# Patient Record
Sex: MALE | Race: OTHER | HISPANIC OR LATINO | URBAN - METROPOLITAN AREA
[De-identification: names, ages, dates, MRNs, and addresses within clinical notes are randomized per-mention and may not be internally consistent; named-entity substitution may affect disease eponyms.]

---

## 2017-07-01 ENCOUNTER — OUTPATIENT (OUTPATIENT)
Dept: OUTPATIENT SERVICES | Facility: HOSPITAL | Age: 67
LOS: 1 days | End: 2017-07-01
Payer: MEDICAID

## 2017-07-01 PROCEDURE — G9001: CPT

## 2017-07-21 ENCOUNTER — EMERGENCY (EMERGENCY)
Facility: HOSPITAL | Age: 67
LOS: 1 days | Discharge: HOME | End: 2017-07-21
Attending: EMERGENCY MEDICINE | Admitting: EMERGENCY MEDICINE
Payer: COMMERCIAL

## 2017-07-21 VITALS
TEMPERATURE: 98 F | RESPIRATION RATE: 18 BRPM | HEART RATE: 71 BPM | SYSTOLIC BLOOD PRESSURE: 150 MMHG | DIASTOLIC BLOOD PRESSURE: 79 MMHG | OXYGEN SATURATION: 96 %

## 2017-07-21 PROCEDURE — 99282 EMERGENCY DEPT VISIT SF MDM: CPT

## 2017-07-21 NOTE — ED PROVIDER NOTE - NS ED ROS FT
Constitutional: no fever, no chills.  Eyes: no visual changes.  ENMT: no sore throat.  CV: no chest pain.  Resp: no cough, no shortness of breath.  GI: no abdominal pain, no nausea, no vomiting, no diarrhea.  : no dysuria, no hematuria.  MSK: no back pain, no neck pain.  Skin: no rashes.  Neuro: no headache, no loss of consciousness, no weakness, no numbness, no tingling.  Psych: no known mental health issues.  Endo: +diabetes, no thyroid trouble.

## 2017-07-21 NOTE — ED PROVIDER NOTE - CARE PLAN
Principal Discharge DX:	Varicose vein of leg Principal Discharge DX:	Varicose vein of leg  Instructions for follow-up, activity and diet:	1. Bleeding varicose vein  2. Keep dressings dry. Keep in place for 5-7 days.   3. Follow-up with your primary care doctor in 24-48hrs.   4. Return immediately to the emergency department if any worsening or concerning symptoms.

## 2017-07-21 NOTE — ED PROVIDER NOTE - OBJECTIVE STATEMENT
Resident: 67y M PMH DM presents with bleeding varicose vein. Occurred this morning while in showed, tried pressure dressing but continued bleeding. No anticoagulation.

## 2017-07-21 NOTE — ED PROVIDER NOTE - PLAN OF CARE
1. Bleeding varicose vein  2. Keep dressings dry. Keep in place for 5-7 days.   3. Follow-up with your primary care doctor in 24-48hrs.   4. Return immediately to the emergency department if any worsening or concerning symptoms.

## 2017-07-21 NOTE — ED PROVIDER NOTE - ATTENDING CONTRIBUTION TO CARE
Attending MD Venegas:  I personally have seen and examined this patient.  Resident note reviewed and agree on plan of care and except where noted.  See HPI, PE, and MDM for details.

## 2017-07-21 NOTE — ED PROVIDER NOTE - MEDICAL DECISION MAKING DETAILS
Attending MD Venegas: 67M presenting with bleeding from right anterior shin varicosity, no anticoagulants. Will attempt local hemostatic control with surgicel

## 2017-07-21 NOTE — ED ADULT NURSE NOTE - OBJECTIVE STATEMENT
67 year old male presents to ED c/o varicose vein that started bleeding while he was in the shower. Surgicel applied by ED MD, patient denies any pain or discomfort.

## 2017-07-21 NOTE — ED PROVIDER NOTE - PHYSICAL EXAMINATION
Attending MD Venegas: A & O x 3, NAD, right anterior shin: oozing varicosity, bilateral venous stasis changes of b/l shins. sensation intact in b/l LEs, 2+ dp pulses b/l

## 2017-07-24 DIAGNOSIS — R69 ILLNESS, UNSPECIFIED: ICD-10-CM

## 2019-08-31 ENCOUNTER — EMERGENCY (EMERGENCY)
Facility: HOSPITAL | Age: 69
LOS: 1 days | Discharge: HOME | End: 2019-08-31
Attending: EMERGENCY MEDICINE
Payer: COMMERCIAL

## 2019-08-31 VITALS
SYSTOLIC BLOOD PRESSURE: 113 MMHG | TEMPERATURE: 98 F | WEIGHT: 192.02 LBS | HEART RATE: 84 BPM | DIASTOLIC BLOOD PRESSURE: 71 MMHG | OXYGEN SATURATION: 99 % | HEIGHT: 68 IN | RESPIRATION RATE: 18 BRPM

## 2019-08-31 PROBLEM — I10 ESSENTIAL (PRIMARY) HYPERTENSION: Chronic | Status: ACTIVE | Noted: 2017-07-21

## 2019-08-31 PROBLEM — E11.9 TYPE 2 DIABETES MELLITUS WITHOUT COMPLICATIONS: Chronic | Status: ACTIVE | Noted: 2017-07-21

## 2019-08-31 LAB
ALBUMIN SERPL ELPH-MCNC: 4.4 G/DL — SIGNIFICANT CHANGE UP (ref 3.3–5)
ALP SERPL-CCNC: 65 U/L — SIGNIFICANT CHANGE UP (ref 40–120)
ALT FLD-CCNC: 16 U/L — SIGNIFICANT CHANGE UP (ref 10–45)
ANION GAP SERPL CALC-SCNC: 16 MMOL/L — SIGNIFICANT CHANGE UP (ref 5–17)
APPEARANCE UR: CLEAR — SIGNIFICANT CHANGE UP
APTT BLD: 31.6 SEC — SIGNIFICANT CHANGE UP (ref 27.5–36.3)
AST SERPL-CCNC: 12 U/L — SIGNIFICANT CHANGE UP (ref 10–40)
BACTERIA # UR AUTO: NEGATIVE — SIGNIFICANT CHANGE UP
BASOPHILS # BLD AUTO: 0 K/UL — SIGNIFICANT CHANGE UP (ref 0–0.2)
BASOPHILS NFR BLD AUTO: 0.4 % — SIGNIFICANT CHANGE UP (ref 0–2)
BILIRUB SERPL-MCNC: 0.5 MG/DL — SIGNIFICANT CHANGE UP (ref 0.2–1.2)
BILIRUB UR-MCNC: NEGATIVE — SIGNIFICANT CHANGE UP
BLD GP AB SCN SERPL QL: NEGATIVE — SIGNIFICANT CHANGE UP
BUN SERPL-MCNC: 16 MG/DL — SIGNIFICANT CHANGE UP (ref 7–23)
CALCIUM SERPL-MCNC: 10 MG/DL — SIGNIFICANT CHANGE UP (ref 8.4–10.5)
CHLORIDE SERPL-SCNC: 96 MMOL/L — SIGNIFICANT CHANGE UP (ref 96–108)
CO2 SERPL-SCNC: 25 MMOL/L — SIGNIFICANT CHANGE UP (ref 22–31)
COLOR SPEC: YELLOW — SIGNIFICANT CHANGE UP
COMMENT - URINE: SIGNIFICANT CHANGE UP
CREAT SERPL-MCNC: 0.86 MG/DL — SIGNIFICANT CHANGE UP (ref 0.5–1.3)
DIFF PNL FLD: NEGATIVE — SIGNIFICANT CHANGE UP
EOSINOPHIL # BLD AUTO: 0.3 K/UL — SIGNIFICANT CHANGE UP (ref 0–0.5)
EOSINOPHIL NFR BLD AUTO: 2.6 % — SIGNIFICANT CHANGE UP (ref 0–6)
EPI CELLS # UR: 0 — SIGNIFICANT CHANGE UP
ETHANOL SERPL-MCNC: SIGNIFICANT CHANGE UP MG/DL (ref 0–10)
GLUCOSE BLDC GLUCOMTR-MCNC: 131 MG/DL — HIGH (ref 70–99)
GLUCOSE BLDC GLUCOMTR-MCNC: 136 MG/DL — HIGH (ref 70–99)
GLUCOSE SERPL-MCNC: 128 MG/DL — HIGH (ref 70–99)
GLUCOSE UR QL: NEGATIVE — SIGNIFICANT CHANGE UP
HCT VFR BLD CALC: 42.9 % — SIGNIFICANT CHANGE UP (ref 39–50)
HGB BLD-MCNC: 14.8 G/DL — SIGNIFICANT CHANGE UP (ref 13–17)
HYALINE CASTS # UR AUTO: 4 /LPF — SIGNIFICANT CHANGE UP (ref 0–7)
INR BLD: 1.09 RATIO — SIGNIFICANT CHANGE UP (ref 0.88–1.16)
KETONES UR-MCNC: NEGATIVE — SIGNIFICANT CHANGE UP
LEUKOCYTE ESTERASE UR-ACNC: NEGATIVE — SIGNIFICANT CHANGE UP
LYMPHOCYTES # BLD AUTO: 1.9 K/UL — SIGNIFICANT CHANGE UP (ref 1–3.3)
LYMPHOCYTES # BLD AUTO: 20 % — SIGNIFICANT CHANGE UP (ref 13–44)
MCHC RBC-ENTMCNC: 34.5 GM/DL — SIGNIFICANT CHANGE UP (ref 32–36)
MCHC RBC-ENTMCNC: 34.8 PG — HIGH (ref 27–34)
MCV RBC AUTO: 101 FL — HIGH (ref 80–100)
MONOCYTES # BLD AUTO: 0.7 K/UL — SIGNIFICANT CHANGE UP (ref 0–0.9)
MONOCYTES NFR BLD AUTO: 7.8 % — SIGNIFICANT CHANGE UP (ref 2–14)
NEUTROPHILS # BLD AUTO: 6.6 K/UL — SIGNIFICANT CHANGE UP (ref 1.8–7.4)
NEUTROPHILS NFR BLD AUTO: 69.1 % — SIGNIFICANT CHANGE UP (ref 43–77)
NITRITE UR-MCNC: NEGATIVE — SIGNIFICANT CHANGE UP
PH UR: 5.5 — SIGNIFICANT CHANGE UP (ref 5–8)
PLATELET # BLD AUTO: 284 K/UL — SIGNIFICANT CHANGE UP (ref 150–400)
POTASSIUM SERPL-MCNC: 3.8 MMOL/L — SIGNIFICANT CHANGE UP (ref 3.5–5.3)
POTASSIUM SERPL-SCNC: 3.8 MMOL/L — SIGNIFICANT CHANGE UP (ref 3.5–5.3)
PROT SERPL-MCNC: 7.4 G/DL — SIGNIFICANT CHANGE UP (ref 6–8.3)
PROT UR-MCNC: ABNORMAL
PROTHROM AB SERPL-ACNC: 12.5 SEC — SIGNIFICANT CHANGE UP (ref 10–12.9)
RBC # BLD: 4.26 M/UL — SIGNIFICANT CHANGE UP (ref 4.2–5.8)
RBC # FLD: 11.7 % — SIGNIFICANT CHANGE UP (ref 10.3–14.5)
RBC CASTS # UR COMP ASSIST: 2 /HPF — SIGNIFICANT CHANGE UP (ref 0–4)
RH IG SCN BLD-IMP: POSITIVE — SIGNIFICANT CHANGE UP
SODIUM SERPL-SCNC: 137 MMOL/L — SIGNIFICANT CHANGE UP (ref 135–145)
SP GR SPEC: 1.02 — SIGNIFICANT CHANGE UP (ref 1.01–1.02)
UROBILINOGEN FLD QL: ABNORMAL
WBC # BLD: 9.5 K/UL — SIGNIFICANT CHANGE UP (ref 3.8–10.5)
WBC # FLD AUTO: 9.5 K/UL — SIGNIFICANT CHANGE UP (ref 3.8–10.5)
WBC UR QL: 1 /HPF — SIGNIFICANT CHANGE UP (ref 0–5)

## 2019-08-31 PROCEDURE — 70450 CT HEAD/BRAIN W/O DYE: CPT | Mod: 26,59

## 2019-08-31 PROCEDURE — 70498 CT ANGIOGRAPHY NECK: CPT | Mod: 26

## 2019-08-31 PROCEDURE — 99218: CPT

## 2019-08-31 PROCEDURE — 70496 CT ANGIOGRAPHY HEAD: CPT | Mod: 26

## 2019-08-31 PROCEDURE — 70450 CT HEAD/BRAIN W/O DYE: CPT | Mod: 26,59,77

## 2019-08-31 PROCEDURE — 99283 EMERGENCY DEPT VISIT LOW MDM: CPT

## 2019-08-31 RX ORDER — ACETAMINOPHEN 500 MG
650 TABLET ORAL ONCE
Refills: 0 | Status: COMPLETED | OUTPATIENT
Start: 2019-08-31 | End: 2019-08-31

## 2019-08-31 RX ORDER — INSULIN LISPRO 100/ML
VIAL (ML) SUBCUTANEOUS AT BEDTIME
Refills: 0 | Status: DISCONTINUED | OUTPATIENT
Start: 2019-08-31 | End: 2019-09-14

## 2019-08-31 RX ORDER — SODIUM CHLORIDE 9 MG/ML
1000 INJECTION, SOLUTION INTRAVENOUS
Refills: 0 | Status: DISCONTINUED | OUTPATIENT
Start: 2019-08-31 | End: 2019-09-14

## 2019-08-31 RX ORDER — METOCLOPRAMIDE HCL 10 MG
10 TABLET ORAL ONCE
Refills: 0 | Status: COMPLETED | OUTPATIENT
Start: 2019-08-31 | End: 2019-08-31

## 2019-08-31 RX ORDER — DEXTROSE 50 % IN WATER 50 %
15 SYRINGE (ML) INTRAVENOUS ONCE
Refills: 0 | Status: DISCONTINUED | OUTPATIENT
Start: 2019-08-31 | End: 2019-09-14

## 2019-08-31 RX ORDER — GLUCAGON INJECTION, SOLUTION 0.5 MG/.1ML
1 INJECTION, SOLUTION SUBCUTANEOUS ONCE
Refills: 0 | Status: DISCONTINUED | OUTPATIENT
Start: 2019-08-31 | End: 2019-09-14

## 2019-08-31 RX ORDER — DEXTROSE 50 % IN WATER 50 %
12.5 SYRINGE (ML) INTRAVENOUS ONCE
Refills: 0 | Status: DISCONTINUED | OUTPATIENT
Start: 2019-08-31 | End: 2019-09-14

## 2019-08-31 RX ORDER — DEXTROSE 50 % IN WATER 50 %
25 SYRINGE (ML) INTRAVENOUS ONCE
Refills: 0 | Status: DISCONTINUED | OUTPATIENT
Start: 2019-08-31 | End: 2019-09-14

## 2019-08-31 RX ORDER — INSULIN LISPRO 100/ML
VIAL (ML) SUBCUTANEOUS
Refills: 0 | Status: DISCONTINUED | OUTPATIENT
Start: 2019-08-31 | End: 2019-09-14

## 2019-08-31 RX ADMIN — Medication 650 MILLIGRAM(S): at 13:20

## 2019-08-31 RX ADMIN — Medication 10 MILLIGRAM(S): at 13:20

## 2019-08-31 RX ADMIN — Medication 650 MILLIGRAM(S): at 13:57

## 2019-08-31 NOTE — ED ADULT TRIAGE NOTE - CHIEF COMPLAINT QUOTE
Fall on Monday with diagnosed left jaw fracture and intracranial hemorrhage. Dizziness, nausea, headache since yesterday.

## 2019-08-31 NOTE — ED CDU PROVIDER DISPOSITION NOTE - NSFOLLOWUPINSTRUCTIONS_ED_ALL_ED_FT
- stay hydrated. take all medications as prescribed  - Take tylenol 975mg as needed for pain.   - follow up with your pcp in 1-2 days.  - follow up with neurosurgery this week, call [phone] to make an appointment.  - return if symptoms worsen, have changes in vision, numbness, tingling, paresthesias, worsening headaches, and all other concerns. - stay hydrated. take all medications as prescribed  - Take tylenol 975mg as needed for pain. do not take ibuprofen/aleve/neproxen for pain. Do not take aspirin until neurosurgery says you can start it again.   - follow up with your pcp in 1-2 days.  - follow up with neurosurgery this week, call [phone] to make an appointment.  - return if symptoms worsen, have changes in vision, numbness, tingling, paresthesias, worsening headaches, and all other concerns. - stay hydrated. take all medications as prescribed  - Take tylenol 975mg as needed for pain. do not take ibuprofen/aleve/neproxen for pain. Do not take aspirin until neurosurgery says you can start it again.   - follow up with your pcp in 1-2 days.  - follow up with neurosurgery this week, call [phone] to make an appointment.  -Follow up with ENT this week, call [phone] to make an appointment.  - return if symptoms worsen, have changes in vision, numbness, tingling, paresthesias, worsening headaches, and all other concerns. - stay hydrated. take all medications as prescribed  - Take tylenol 975mg as needed for pain. do not take ibuprofen/aleve/neproxen for pain. Do not take aspirin until neurosurgery says you can start it again.      Use Ofloxacin 5 drops in left ear 2 x per day     Continue Keppra as previously prescribed  - follow up with your pcp in 1-2 days.  - follow up with neurosurgery this week, Dr. Orozco [phone]  - Follow up with ENT this week, call [phone] to make an appointment.  - return if symptoms worsen, have changes in vision, numbness, tingling, paresthesias, worsening headaches, and all other concerns.

## 2019-08-31 NOTE — ED ADULT NURSE REASSESSMENT NOTE - COMFORT CARE
repositioned/wait time explained/assisted to bathroom/plan of care explained
walker provided./ambulated to bathroom/darkened lights/plan of care explained/po fluids offered/repositioned/warm blanket provided

## 2019-08-31 NOTE — ED CDU PROVIDER INITIAL DAY NOTE - OBJECTIVE STATEMENT
68yo M pmh DM, htn, recent head bleed s/p fall on Monday presenting with headache. Patient was drinking monday evening, fell onto left side of face, seen at [place] and found to have left occipital fracture and some sort of "head bleed". Patient was discharged on Wednesday and has been taking NaCl tabs and keppra. Stopped taking NaCl tabs Thursday because they make him nauseous. Patient has a headache and dizziness that seems fairly constant, sometimes feels like the room is spinning. This has been persistent since Monday, nothing worse than before. Daily drinker, 2 drinks per night, states he stopped since his fall.    In ED pt's labwork non-actionable, CTH showed Acute hemorrhage involving the bilateral  anterior paramedian frontal lobes with no midline shift, mass effect or hydrocephalus, neurosurgery consulted, felt more contusions 2/2 pt's fall 5 d ago, recommended 4 hr interval CTH and continued observation. Pt sent to CDU fro q4 hr neuro checks, 4hr interval CTH, continued observation. Case discussed w/ ED attending Dr. Galvan.

## 2019-08-31 NOTE — ED CDU PROVIDER DISPOSITION NOTE - CLINICAL COURSE
70yo M pmh DM, htn, recent head bleed s/p fall on Monday presenting with headache. Patient was drinking monday evening, fell onto left side of face, seen at [place] and found to have left occipital fracture and some sort of "head bleed". Patient was discharged on Wednesday and has been taking NaCl tabs and keppra. Stopped taking NaCl tabs Thursday because they make him nauseous. Patient has a headache and dizziness that seems fairly constant, sometimes feels like the room is spinning. This has been persistent since Monday, nothing worse than before. Daily drinker, 2 drinks per night, states he stopped since his fall.  	In ED pt's labwork non-actionable, CTH showed Acute hemorrhage involving the bilateral  anterior paramedian frontal lobes with no midline shift, mass effect or hydrocephalus, neurosurgery consulted, felt more contusions 2/2 pt's fall 5 d ago, recommended 4 hr interval CTH and continued observation. Pt sent to CDU fro q4 hr neuro checks, 4hr interval CTH, continued observation.           In CDU pts repeat CT scan showed no interval change of bleed, Lobulated high density structure in the right inferior frontal lobe measuring up to 1.1 cm concerning for aneurysm. CTA completed which Previously noted lobulated high density structure in the right inferior frontal lobe represents another intraparenchymal bleed, less likely an aneurysm. Neuro surgery reviewed CT scans and state bleed is stable and unchanged despite results of CTA, feel comfortable to pt going home, however Pt and Dr. Galvan planned on pt staying over night for neuro checks. Pt with unchanged unsteadiness with ambulation, walks with walker, will d/c home with PMD and neurosurgery follow up. Case D/w______ 68yo M pmh DM, htn, recent head bleed s/p fall on Monday presenting with headache. Patient was drinking monday evening, fell onto left side of face, seen at [place] and found to have left occipital fracture and some sort of "head bleed". Patient was discharged on Wednesday and has been taking NaCl tabs and keppra. Stopped taking NaCl tabs Thursday because they make him nauseous. Patient has a headache and dizziness that seems fairly constant, sometimes feels like the room is spinning. This has been persistent since Monday, nothing worse than before. Daily drinker, 2 drinks per night, states he stopped since his fall.  	In ED pt's labwork non-actionable, CTH showed Acute hemorrhage involving the bilateral  anterior paramedian frontal lobes with no midline shift, mass effect or hydrocephalus, neurosurgery consulted, felt more contusions 2/2 pt's fall 5 d ago, recommended 4 hr interval CTH and continued observation. Pt sent to CDU fro q4 hr neuro checks, 4hr interval CTH, continued observation.           In CDU pts repeat CT scan showed no interval change of bleed, Lobulated high density structure in the right inferior frontal lobe measuring up to 1.1 cm concerning for aneurysm. CTA completed which Previously noted lobulated high density structure in the right inferior frontal lobe represents another intraparenchymal bleed, less likely an aneurysm. Neuro surgery reviewed CT scans and state bleed is stable and unchanged, however presence of hemotympanum noted on CTA, ENT consulted, recommends________. Pt walks with walker, will d/c home with PMD and neurosurgery/ENT follow up. Case D/w______ 68yo M pmh DM, htn, recent head bleed s/p fall on Monday presenting with headache. Patient was drinking monday evening, fell onto left side of face, seen at [place] and found to have left occipital fracture and some sort of "head bleed". Patient was discharged on Wednesday and has been taking NaCl tabs and keppra. Stopped taking NaCl tabs Thursday because they make him nauseous. Patient has a headache and dizziness that seems fairly constant, sometimes feels like the room is spinning. This has been persistent since Monday, nothing worse than before. Daily drinker, 2 drinks per night, states he stopped since his fall.  	In ED pt's labwork non-actionable, CTH showed Acute hemorrhage involving the bilateral  anterior paramedian frontal lobes with no midline shift, mass effect or hydrocephalus, neurosurgery consulted, felt more contusions 2/2 pt's fall 5 d ago, recommended 4 hr interval CTH and continued observation. Pt sent to CDU fro q4 hr neuro checks, 4hr interval CTH, continued observation.           In CDU pts repeat CT scan showed no interval change of bleed, Lobulated high density structure in the right inferior frontal lobe measuring up to 1.1 cm concerning for aneurysm. CTA completed which Previously noted lobulated high density structure in the right inferior frontal lobe represents another intraparenchymal bleed, less likely an aneurysm. Neuro surgery reviewed CT scans and state bleed is stable and unchanged, however presence of hemotympanum noted on CTA, ENT consulted, recommends ofloxacin drops and outpatient follow up. Pt walks with walker, will d/c home with PMD and neurosurgery/ENT follow up. Case D/w Dr. downing

## 2019-08-31 NOTE — ED PROVIDER NOTE - PROGRESS NOTE DETAILS
AK: Spoke to NeuroSx, recommend 4hr repeat CTH. No ddavp or platelets. Suspect resolving contusion/bleed, not acute. Will discuss with attending and see patient. AK: Spoke with neurosx. Agree with plan for CDU. Accepted by CDU.

## 2019-08-31 NOTE — CONSULT NOTE ADULT - ASSESSMENT
NathanFerdinand  69M pmhx etoh on ASA81 dependence s/p fall 5 days ago treated at [place] for bifrontal contusions represents for episode of dizziness and intermittent headache. CT scan shows subacute bifrontal contusions, intact on exam, sodium WNL  - continue keppra  - family requests overnight observation, q4h neurochecks  - repeat CTH in 4h, if stable may f/u as outpatient after discharge NahtanFerdinand  69M pmhx etoh on ASA81 dependence s/p fall 5 days ago treated at [place] for bifrontal contusions represents for episode of dizziness and intermittent headache. CT scan shows subacute bifrontal contusions, intact on exam, sodium WNL  - continue keppra  - family requests overnight observation, q4h neurochecks  - repeat CTH in 4h, if stable may f/u as outpatient after discharge  - Hold asa81 until cleared as outpatient

## 2019-08-31 NOTE — ED PROVIDER NOTE - CLINICAL SUMMARY MEDICAL DECISION MAKING FREE TEXT BOX
ATTG: : head trauma on Mon with SDH, here with continued headache and non com with meds and concern for bleeding electrolyte abnl. will check ct head, check labs, check urine, re eval for dispo.

## 2019-08-31 NOTE — ED PROVIDER NOTE - ATTENDING CONTRIBUTION TO CARE
70 y/o m with pmhx DM type 2, HTN, SDH from a fall on Monday, seen at [facility], treated and discharged, also with left occipital fracture. he was prescribed medications and has not been complaint with sodium tabs secondary to nasuea and vomiting. son with him at the bedside. states he was drinking approx 2-3 drinks liquor daily since wife  a few months ago which caused him to fall. no fever or chills. has a headache worsening and associated dizziness. no chest pain or heart palp. no other complaints.   GCS 15  Gen.  no acute distress  HEENT:  perrl eomi pupils 3mm reactive to light equally, c spine no step off or tenderness  back no step off or tend  Lungs:  b/l bs  CVS: S1S2   Abd;  soft non tender. no distention. healed surgical wound lower abd closed.   Ext: no pitting edema or erythema  Neuro: aaox3, clear speech. appropriate  MSK: moving all ext spon . strength 5/5 b/l upper and lower ext

## 2019-08-31 NOTE — ED CDU PROVIDER DISPOSITION NOTE - CARE PROVIDERS DIRECT ADDRESSES
[email or web address]

## 2019-08-31 NOTE — ED CDU PROVIDER DISPOSITION NOTE - PATIENT PORTAL LINK FT
You can access the FollowMyHealth Patient Portal offered by [facility] by registering at the following website: [email or web address]. By joining [employer]’s FollowMyHealth portal, you will also be able to view your health information using other applications (apps) compatible with our system.

## 2019-08-31 NOTE — ED ADULT NURSE REASSESSMENT NOTE - GENERAL PATIENT STATE
no change observed/comfortable appearance/cooperative/resting/sleeping/smiling/interactive
comfortable appearance/resting/sleeping/smiling/interactive/cooperative/improvement verbalized/family/SO at bedside

## 2019-08-31 NOTE — ED CDU PROVIDER DISPOSITION NOTE - NSFOLLOWUPCLINICS_GEN_ALL_ED_FT
[facility] - ENT  Otolaryngology (ENT)  [address] [place] [address]  Phone: [phone]  Fax:   Follow Up Time:

## 2019-08-31 NOTE — ED CDU PROVIDER INITIAL DAY NOTE - PROGRESS NOTE DETAILS
Rpt CTH shows stable bifrontal contusions, however now noting right inferior frontal lobe which appears to communicate with the supraclinoid right internal carotid artery, concerning for an aneurysm. Spoke w/ neurosurg resident Raad who was made aware of this finding, advised to obtain CTA head/neck for further eval. Attending aware. - Alfa Ngo PA-C Rpt CTH shows stable bifrontal contusions, however now noting right inferior frontal lobe which appears to communicate with the supraclinoid right internal carotid artery, concerning for an aneurysm. Spoke w/ neurosurg resident Raad who was made aware of this finding, advised to obtain CTA head/neck for further eval. Attending aware. No change in pt exam. - Alfa Ngo PA-C Rpt CTH shows stable bifrontal contusions, however now noting right inferior frontal lobe which appears to communicate with the supraclinoid right internal carotid artery, concerning for an aneurysm. Spoke w/ neurosurg resident Raad who was made aware of this finding, advised to obtain CTA head/neck for further eval. Attending Dr. Galvan made aware, agrees w/ plan. No change in pt exam. - Alfa Ngo PA-C CTA prelimr ead shows another possible intraparenchymal bleed. Called Neurosurgery and spoke with them about results, they reviewed all scans and results and advised the scans look stable, no change from initial, no change in initial plan. They advised pt could be discharged from their standpoint however initial ED team requested overnight observation. Will continue to monitor. Pt signed out to oncoming night PA. - Alfa Ngo PA-C CTA prelim read shows another possible intraparenchymal bleed, less likely an aneursym. Called Neurosurgery and spoke with them about results, they reviewed all scans and results and advised the scans look stable, no change from initial, no change in initial plan. They advised pt could be discharged from their standpoint however initial ED team requested overnight observation. Will continue to monitor. Pt signed out to oncoming night PA. - Alfa Ngo PA-C Pt resting comfortably. NAD. No complaints. VSS. to new numbness, tingling paresthesias, no pronator drift, strength 5/5 BL U/LE. CT showed stable intraparenchymal bleed per neurosurgery, pt would like to stay until the morning for monitoring and as per original plan per ED team. no events on tele will continue to monitor.

## 2019-08-31 NOTE — ED PROVIDER NOTE - PHYSICAL EXAMINATION
Gen: No acute distress, alert, cooperative  HENT: Normocephalic, atraumatic.   Eyes: PERRL, 3mm reactive ESAU, EOMI  Resp: CTAB, non-labored, no wheeze  CV: rrr, no murmur  Abd: soft, NTND, no rebound or guarding, healed surgical scar lower abdomen  MSK: No CVAT bilaterally, no midline ttp  Skin: warm and dry  Neuro: AOx3, speech is fluent and appropriate, no focal deficits, normal strength and sensation throughout, normal gait, finger to nose normal

## 2019-08-31 NOTE — ED PROVIDER NOTE - OBJECTIVE STATEMENT
70yo M pmh DM, htn, recent head bleed s/p fall on Monday presenting with headache. Patient was drinking monday evening, fell onto left side of face, seen at [place] and found to have left occipital fracture and some sort of "head bleed". Patient was discharged on Wednesday and has been taking NaCl tabs and keppra. Stopped taking NaCl tabs Thursday because they make him nauseous. Patient has a headache and dizziness that seems fairly constant, sometimes feels like the room is spinning. This has been persistent since Monday, nothing worse than before. Daily drinker, 2 drinks per night, states he stopped since his fall.

## 2019-08-31 NOTE — ED ADULT NURSE NOTE - OBJECTIVE STATEMENT
70 y/o M, PMH HTN, DMT2, presents to ED with son for worsening headache, nausea, dizziness s/p fall and dx ICH at [place] on Monday. Pt's son reports pt's wife  in April and he has been drinking 2-3 shots of scotch every day since, which is what caused him to fall and hit L side of his head on Monday, dx with ICH in [place] and d/c after 3 days. Son brought pt in because he has been unable to take his meds due to nausea. Pt c/o mild 5/10 L headache, dizziness and intermittent nausea, no vomiting at this time. Pt AAOx3, no confusion, neurologically intact, PERRLA 2mm, no blurry vision, NAD, resp nonlabored, abdomen soft nontender nondistended, pt following commands, Padmini independently, 5/5 equal strength to extremities x 4, no sensory deficits, no numbness/tingling, strong peripheral pulses x 4, cap refill < 2 seconds, skin warm and dry, ambulating independently, steady gait noted. Pt denies SI/HI. Safety maintained.

## 2019-08-31 NOTE — ED CDU PROVIDER DISPOSITION NOTE - CARE PROVIDER_API CALL
Patrick Orozco)  Neurosurgery  General  [address] [facility] [address], [address]  [place] [address]  Phone: [phone]  Fax: [phone]  Follow Up Time:

## 2019-09-01 ENCOUNTER — OUTPATIENT (OUTPATIENT)
Dept: OUTPATIENT SERVICES | Facility: HOSPITAL | Age: 69
LOS: 1 days | End: 2019-09-01
Payer: MEDICARE

## 2019-09-01 VITALS
RESPIRATION RATE: 18 BRPM | OXYGEN SATURATION: 97 % | TEMPERATURE: 98 F | HEART RATE: 75 BPM | SYSTOLIC BLOOD PRESSURE: 128 MMHG | DIASTOLIC BLOOD PRESSURE: 77 MMHG

## 2019-09-01 DIAGNOSIS — H74.8X9 OTHER SPECIFIED DISORDERS OF MIDDLE EAR AND MASTOID, UNSPECIFIED EAR: ICD-10-CM

## 2019-09-01 LAB
GLUCOSE BLDC GLUCOMTR-MCNC: 149 MG/DL — HIGH (ref 70–99)
HBA1C BLD-MCNC: 8.2 % — HIGH (ref 4–5.6)

## 2019-09-01 PROCEDURE — G9001: CPT

## 2019-09-01 PROCEDURE — 81001 URINALYSIS AUTO W/SCOPE: CPT

## 2019-09-01 PROCEDURE — 70496 CT ANGIOGRAPHY HEAD: CPT

## 2019-09-01 PROCEDURE — 99217: CPT

## 2019-09-01 PROCEDURE — 85610 PROTHROMBIN TIME: CPT

## 2019-09-01 PROCEDURE — 86901 BLOOD TYPING SEROLOGIC RH(D): CPT

## 2019-09-01 PROCEDURE — 83036 HEMOGLOBIN GLYCOSYLATED A1C: CPT

## 2019-09-01 PROCEDURE — 86850 RBC ANTIBODY SCREEN: CPT

## 2019-09-01 PROCEDURE — 96374 THER/PROPH/DIAG INJ IV PUSH: CPT | Mod: XU

## 2019-09-01 PROCEDURE — 80307 DRUG TEST PRSMV CHEM ANLYZR: CPT

## 2019-09-01 PROCEDURE — 80053 COMPREHEN METABOLIC PANEL: CPT

## 2019-09-01 PROCEDURE — 70498 CT ANGIOGRAPHY NECK: CPT

## 2019-09-01 PROCEDURE — 82962 GLUCOSE BLOOD TEST: CPT

## 2019-09-01 PROCEDURE — 86900 BLOOD TYPING SEROLOGIC ABO: CPT

## 2019-09-01 PROCEDURE — 99284 EMERGENCY DEPT VISIT MOD MDM: CPT | Mod: 25

## 2019-09-01 PROCEDURE — 85027 COMPLETE CBC AUTOMATED: CPT

## 2019-09-01 PROCEDURE — 70450 CT HEAD/BRAIN W/O DYE: CPT

## 2019-09-01 PROCEDURE — 85730 THROMBOPLASTIN TIME PARTIAL: CPT

## 2019-09-01 PROCEDURE — G0378: CPT

## 2019-09-01 RX ORDER — OFLOXACIN 0.3 %
5 DROPS OPHTHALMIC (EYE)
Refills: 0 | Status: DISCONTINUED | OUTPATIENT
Start: 2019-09-01 | End: 2019-09-14

## 2019-09-01 RX ADMIN — Medication 5 DROP(S): at 11:37

## 2019-09-01 NOTE — ED CDU PROVIDER SUBSEQUENT DAY NOTE - HISTORY
Pt resting comfortably. NAD. No complaints. VSS. no events on telemetry, no new neurological defects, no numbness, tingling, paresthesias, weaknesses or worsening ataxia. Pt resting comfortably. NAD. No complaints. VSS. no events on telemetry, no new neurological defects, no numbness, tingling, paresthesias, weaknesses or worsening ataxia, ambulating steadily with walker. -Jayna Elena PA-C

## 2019-09-01 NOTE — ED ADULT NURSE REASSESSMENT NOTE - NS ED NURSE REASSESS COMMENT FT1
report taken from Cherrie MCGOWAN. states pt had good night with no complaints. Will continue to monitor.
Pt AAOx3, NAD, resp nonlabored, resting comfortably in bed with family at bedside. Pt ambulated to bathroom with 1 assist. Pt reports some improvement in dizziness, still experiences it when sitting up and standing up. No nausea at this time, no vomiting in ED. Pt denies headache, chest pain, palpitations, SOB, abd pain, fevers, chills, weakness at this time. Pt awaiting CDU consult and dispo. Safety maintained.
Received pt from ROSLYN Callaway , received pt alert and responsive, oriented x4, denies any respiratory distress, SOB, or difficulty breathing. Pt transferred to CDU for dizziness. + c/o dizziness and unsteady gait. Neuro checks to be completed q4.  IV in place, patent and free of signs of infiltration, V/S stable, pt afebrile, pt denies pain at this time. Pt educated on unit and unit rules, instructed patient to notify RN of any needed assistance, Pt verbalizes understanding, Call bell placed within reach. Safety maintained. Will continue to monitor. Meal provided.

## 2019-09-01 NOTE — CONSULT NOTE ADULT - SUBJECTIVE AND OBJECTIVE BOX
p (1480)     HPI:  69M pmhx etoh on ASA81 dependence s/p fall 5 days ago treated at [facility] for bifrontal contusions represents for episode of dizziness and intermittent headache. CT scan shows subacute bifrontal contusions    Imaging:    Exam:  AOx3, FC, PERRL, EOMI, no facial   5/5 throughout, no drift  SILT  no clonus    --Anticoagulation:    =====================  PAST MEDICAL HISTORY   Hypertension  Diabetes    PAST SURGICAL HISTORY         MEDICATIONS:  Antibiotics:    Neuro:    Other:      SOCIAL HISTORY:   Occupation:   Marital Status:     FAMILY HISTORY:      ROS: Negative except per HPI    LABS:  PT/INR - ( 31 Aug 2019 13:38 )   PT: 12.5 sec;   INR: 1.09 ratio         PTT - ( 31 Aug 2019 13:38 )  PTT:31.6 sec                        14.8   9.5   )-----------( 284      ( 31 Aug 2019 13:38 )             42.9     08-31    137  |  96  |  16  ----------------------------<  128<H>  3.8   |  25  |  0.86    Ca    10.0      31 Aug 2019 13:38    TPro  7.4  /  Alb  4.4  /  TBili  0.5  /  DBili  x   /  AST  12  /  ALT  16  /  AlkPhos  65  08-31
CC: Left Hemotympanum.     HPI: 70 YO M with PMH of HTN, DM, Recent Head bleed  s/p fall on Monday presenting with headache. Patient was drinking Monday evening, fell onto left side of face, seen at [facility] and found to have left occipital fracture and some sort of "head bleed". Patient was discharged on Wednesday and has been taking NaCl tabs and keppra. Stopped taking NaCl tabs Thursday because they make him nauseous. Patient has a headache and dizziness that seems fairly constant, sometimes feels like the room is spinning. This has been persistent since Monday, nothing worse than before. In ED pt's  CTH showed Acute hemorrhage involving the bilateral  anterior paramedian frontal lobes with no midline shift, mass effect or hydrocephalus, neurosurgery consulted. ENT was consulted for Left Hemotympanum. CT Angio of Brain showed " Near complete opacification left mastoid air cells and left middle ear cavity is compatible with hemotympanum"  . Pt was evaluated. States HA Improved. Doesn't feel Nausea/ Dizzy anymore. Admits Left sided tinnitus, denies hearing loss. Otherwise denies CP/SOB/Palpitations/ Diaphoresis, Blurry vision, fever/chills, Abdominal Pain/N/V/D, Hoarseness Dysphagia/ Odynophagia, recent travel/sick contacts.     PAST MEDICAL & SURGICAL HISTORY:  Hypertension  Diabetes.     Allergies.   No Known Allergies    Intolerances.   MEDICATIONS  (STANDING):  dextrose 5%. 1000 milliLiter(s) (50 mL/Hr) IV Continuous <Continuous>  dextrose 50% Injectable 12.5 Gram(s) IV Push once  dextrose 50% Injectable 25 Gram(s) IV Push once  dextrose 50% Injectable 25 Gram(s) IV Push once  insulin lispro (HumaLOG) corrective regimen sliding scale   SubCutaneous three times a day before meals  insulin lispro (HumaLOG) corrective regimen sliding scale   SubCutaneous at bedtime    MEDICATIONS  (PRN):  dextrose 40% Gel 15 Gram(s) Oral once PRN Blood Glucose LESS THAN 70 milliGRAM(s)/deciliter  glucagon  Injectable 1 milliGRAM(s) IntraMuscular once PRN Glucose LESS THAN 70 milligrams/deciliter      Social History: Daily drinker, 2 drinks per night, states he stopped since his fall.  Family history: None.     ROS:   ENT: all negative except as noted in HPI   CV: denies palpitations  Pulm: denies SOB, cough, hemoptysis  GI: denies change in apetite, indigestion, n/v  : denies pertinent urinary symptoms, urgency  Neuro: denies numbness/tingling, loss of sensation  Psych: denies anxiety  MS: denies muscle weakness, instability  Heme: denies easy bruising or bleeding  Endo: denies heat/cold intolerance, excessive sweating  Vascular: denies LE edema    Vital Signs Last 24 Hrs  T(C): 36.6 (01 Sep 2019 07:26), Max: 36.9 (31 Aug 2019 20:08)  T(F): 97.8 (01 Sep 2019 07:26), Max: 98.4 (31 Aug 2019 20:08)  HR: 75 (01 Sep 2019 07:26) (70 - 90)  BP: 113/70 (01 Sep 2019 07:26) (100/67 - 132/64)  BP(mean): --  RR: 18 (01 Sep 2019 07:26) (16 - 18)  SpO2: 96% (01 Sep 2019 07:26) (95% - 99%)                          14.8   9.5   )-----------( 284      ( 31 Aug 2019 13:38 )             42.9    08-31    137  |  96  |  16  ----------------------------<  128<H>  3.8   |  25  |  0.86    Ca    10.0      31 Aug 2019 13:38    TPro  7.4  /  Alb  4.4  /  TBili  0.5  /  DBili  x   /  AST  12  /  ALT  16  /  AlkPhos  65  08-31   PT/INR - ( 31 Aug 2019 13:38 )   PT: 12.5 sec;   INR: 1.09 ratio         PTT - ( 31 Aug 2019 13:38 )  PTT:31.6 sec    PHYSICAL EXAM:  Gen: NAD, On RA.   Skin: No rashes, bruises, or lesions.   Head: Normocephalic, Atraumatic.   Face: no edema, erythema, or fluctuance. Parotid glands soft without mass.   Eyes: no scleral injection, No nystagmus.   Ears: Right - ear canal clear, TM intact without effusion or erythema. No evidence of any fluid drainage. No mastoid tenderness, erythema, or ear bulging            Left - Crusted blood in ear canal, not actively bleeding. ,TM intact with overlying blood clots, . No mastoid tenderness, erythema, or ear bulging.  Nose: Nares bilaterally patent, no discharge  Mouth: No Stridor / Drooling / Trismus.  Mucosa moist, tongue/uvula midline, oropharynx clear  Neck: Flat, supple, no lymphadenopathy, trachea midline, no masses  Lymphatic: No lymphadenopathy  Resp: breathing easily, no stridor  CV: no peripheral edema/cyanosis  GI: nondistended   Peripheral vascular: no JVD or edema  Neuro: facial nerve intact, no facial droop    CT Angio of Neck; IMPRESSION:   CT angiography neck: Patent cervical vasculature.  No hemodynamically   significant carotid stenosis or flow-limiting vertebral artery stenosis    CT angiography brain:  1.  No major vessel occlusion, stenosis or aneurysm about the [place] of   [place].  The questionable aneurysm described on prior noncontrast head CT   scan corresponds to a parenchymal hemorrhage in the anterior inferior   right frontal lobe and does not communicate with the adjacent vasculature.  2.  Bilateral frontal intraparenchymal hemorrhages with surrounding edema   are stable.  Mild bilateral frontal subarachnoid hemorrhage is stable.  A   left frontal parietal subdural hematoma measuring approximately 3-4 mm in   caliber is stable.  3.  Nondisplaced fracture involving the left parietal calvarium, squamous   portion of the left temporal bone and mastoid portion of the left   temporal bone.  Near complete opacification left mastoid air cells and   left middle ear cavity is compatible with hemotympanum.        Fiberoptic Indirect laryngoscopy:  (Scope #2 used)        IMAGING/ADDITIONAL STUDIES:

## 2019-09-01 NOTE — ED CDU PROVIDER SUBSEQUENT DAY NOTE - NS ED ROS FT
Constitutional: No fever or chills  Eyes: No visual changes, eye pain or redness  HEENT: No throat pain, ear pain, nasal pain. No nose bleeding.  CV: No chest pain or lower extremity edema  Resp: No SOB no cough  GI: No abd pain. No nausea or vomiting. No diarrhea. No constipation.   : No dysuria, hematuria.   MSK: No musculoskeletal pain  Skin: No rash  Neuro: no current headache, weakness, numbess

## 2019-09-01 NOTE — CONSULT NOTE ADULT - ASSESSMENT
70 YO M with PMH of HTN, DM, Recent Head bleed  s/p fall on Monday presenting with headache. Patient was drinking Monday evening, fell onto left side of face, seen at [facility] and found to have left occipital fracture and some sort of "head bleed". ENT was consulted for Left Hemotympanum. CT Angio of Brain showed " Near complete opacification left mastoid air cells and left middle ear cavity is compatible with hemotympanum"  . Pt was evaluated. States HA Improved. Doesn't feel Nausea/ Dizzy anymore. Admits Left sided tinnitus, denies hearing loss. On Physical Exam,  on Left ear - Crusted blood in ear canal, not actively bleeding. ,TM intact with overlying blood clots, . No nystagmus or facial droop.

## 2019-09-01 NOTE — ED CDU PROVIDER SUBSEQUENT DAY NOTE - PROGRESS NOTE DETAILS
Pt resting comfortably. NAD. No complaints. VSS. no changes in tele, no numbness, tingling, paresthesias, pt steady with walker. no ne neurological defects. will continue to monitor. radiology called, attending radiologist reviewing head CT, concerned for L parietal/temporal bone fx with L hemotempanum. will discuss with neuro surgery. -Jayna Elena PA-C neurosurg aware of hemotympanum, recommends speaking with ENT. pt with bifrontal SDH after fall.  pt feeling better with only slight dizziness with change in position.  no headache, n/v, numbness or paresthesia or weakness.  sl. decreased hearing left. no neck or back pain.  P/E - alert, nad, hemotympanum left, neck-, back-, Neuro - CN, motor, sensory, cerebellar intact and nonfocal. Pt is stable for d/c with neurosurgery and ENT followup. Patient seen at bedside in NAD.  VSS.  Patient resting comfortably without complaints. Patient reports that he is feeling much better.  Only slight dizziness with standing and resolves within a few seconds.  Denies headache.  Ambulating well with walker unassisted.  Neuro exam intact.  Neuro Surgery recommending outpatient follow up.  ENT evaluated patient are reports hemotympanum not large enough to drain, recommending Ofloxacin drops and outpatient follow up.  All lab and radiology results discussed with patient and family at bedside.  Instructed close follow up and strict return precautions provided.  -Gregorio Starks PA-C

## 2019-09-01 NOTE — CONSULT NOTE ADULT - PROBLEM SELECTOR RECOMMENDATION 9
- Reviewed CT scan, with Dr. Akers, no need for surgical intervention.   - Recommend floxin gtts to the Left Ear.   - f/u in ENT clinic 5-7 days.   - Call with questions.     EUGENE HANCOCK PA-C.   # [id number]  ENT.

## 2019-09-01 NOTE — ED CDU PROVIDER SUBSEQUENT DAY NOTE - PHYSICAL EXAMINATION
A&Ox3, NAD. NCAT. PERRL, EOMI. Neck supple, no LAD. Lungs CTAB. +S1S2, RRR, No m/r/g. Abd soft, NT/ND, +BS, no rebound or guarding. Extremities: cap refill <2, pulses in distal extremities 4+, no edema. Skin without rash. CN II-XII intact. Strength 5/5 UE/LE. Sensations intact throughout. Gait steady with walker

## 2019-09-01 NOTE — ED ADULT NURSE REASSESSMENT NOTE - NEURO GAIT
requires assistance/unsteady
ambulatory/unsteady/uses walker x 1 assist./requires assistance
requires assistance/unsteady/uses walker x 1 assist.

## 2019-09-17 DIAGNOSIS — Z71.89 OTHER SPECIFIED COUNSELING: ICD-10-CM

## 2020-05-06 NOTE — ED CDU PROVIDER DISPOSITION NOTE - PRINCIPAL DIAGNOSIS
Intracranial bleed Doxepin Pregnancy And Lactation Text: This medication is Pregnancy Category C and it isn't known if it is safe during pregnancy. It is also excreted in breast milk and breast feeding isn't recommended.

## 2021-01-26 NOTE — ED PROVIDER NOTE - NS ED ATTENDING STATEMENT MOD
Set up next available with Elsy Prado or Liz.   I have personally seen and examined this patient.  I have fully participated in the care of this patient. I have reviewed all pertinent clinical information, including history, physical exam, plan and the Resident’s note and agree except as noted.

## 2022-05-21 ENCOUNTER — EMERGENCY (EMERGENCY)
Facility: HOSPITAL | Age: 72
LOS: 1 days | Discharge: HOME | End: 2022-05-21
Attending: STUDENT IN AN ORGANIZED HEALTH CARE EDUCATION/TRAINING PROGRAM
Payer: COMMERCIAL

## 2022-05-21 VITALS
OXYGEN SATURATION: 97 % | RESPIRATION RATE: 19 BRPM | TEMPERATURE: 98 F | DIASTOLIC BLOOD PRESSURE: 74 MMHG | HEIGHT: 68 IN | WEIGHT: 186.07 LBS | SYSTOLIC BLOOD PRESSURE: 120 MMHG | HEART RATE: 118 BPM

## 2022-05-21 VITALS
OXYGEN SATURATION: 97 % | RESPIRATION RATE: 19 BRPM | HEART RATE: 96 BPM | SYSTOLIC BLOOD PRESSURE: 126 MMHG | TEMPERATURE: 98 F | DIASTOLIC BLOOD PRESSURE: 68 MMHG

## 2022-05-21 PROCEDURE — 90715 TDAP VACCINE 7 YRS/> IM: CPT

## 2022-05-21 PROCEDURE — 70450 CT HEAD/BRAIN W/O DYE: CPT | Mod: MA

## 2022-05-21 PROCEDURE — 70450 CT HEAD/BRAIN W/O DYE: CPT | Mod: 26,MA

## 2022-05-21 PROCEDURE — 93005 ELECTROCARDIOGRAM TRACING: CPT

## 2022-05-21 PROCEDURE — 12011 RPR F/E/E/N/L/M 2.5 CM/<: CPT

## 2022-05-21 PROCEDURE — 90471 IMMUNIZATION ADMIN: CPT

## 2022-05-21 PROCEDURE — 93010 ELECTROCARDIOGRAM REPORT: CPT

## 2022-05-21 PROCEDURE — 82962 GLUCOSE BLOOD TEST: CPT

## 2022-05-21 PROCEDURE — 99284 EMERGENCY DEPT VISIT MOD MDM: CPT | Mod: 25

## 2022-05-21 PROCEDURE — 99285 EMERGENCY DEPT VISIT HI MDM: CPT | Mod: 25

## 2022-05-21 RX ORDER — LISINOPRIL 2.5 MG/1
0 TABLET ORAL
Qty: 0 | Refills: 0 | DISCHARGE

## 2022-05-21 RX ORDER — TETANUS TOXOID, REDUCED DIPHTHERIA TOXOID AND ACELLULAR PERTUSSIS VACCINE, ADSORBED 5; 2.5; 8; 8; 2.5 [IU]/.5ML; [IU]/.5ML; UG/.5ML; UG/.5ML; UG/.5ML
0.5 SUSPENSION INTRAMUSCULAR ONCE
Refills: 0 | Status: COMPLETED | OUTPATIENT
Start: 2022-05-21 | End: 2022-05-21

## 2022-05-21 RX ORDER — METFORMIN HYDROCHLORIDE 850 MG/1
0 TABLET ORAL
Qty: 0 | Refills: 0 | DISCHARGE

## 2022-05-21 RX ORDER — ASPIRIN/CALCIUM CARB/MAGNESIUM 324 MG
1 TABLET ORAL
Qty: 0 | Refills: 0 | DISCHARGE

## 2022-05-21 RX ORDER — IBUPROFEN 200 MG
600 TABLET ORAL ONCE
Refills: 0 | Status: COMPLETED | OUTPATIENT
Start: 2022-05-21 | End: 2022-05-21

## 2022-05-21 RX ADMIN — Medication 600 MILLIGRAM(S): at 20:20

## 2022-05-21 RX ADMIN — Medication 1 TABLET(S): at 20:20

## 2022-05-21 RX ADMIN — TETANUS TOXOID, REDUCED DIPHTHERIA TOXOID AND ACELLULAR PERTUSSIS VACCINE, ADSORBED 0.5 MILLILITER(S): 5; 2.5; 8; 8; 2.5 SUSPENSION INTRAMUSCULAR at 20:30

## 2022-05-21 RX ADMIN — Medication 600 MILLIGRAM(S): at 20:34

## 2022-05-21 NOTE — ED PROVIDER NOTE - PHYSICAL EXAMINATION
General: well appearing male, no acute distress   HEENT: right temporal laceration, right ear laceration through helix   Respiratory: normal work of breathing  MSK: no swelling or tenderness of lower extremities, moving all extremities spontaneously   Skin: warm, dry   Neuro: A&Ox3  Psych: appropriate affect

## 2022-05-21 NOTE — ED PROVIDER NOTE - OBJECTIVE STATEMENT
72M presenting with a head injury. history translated by patient's son at bedside. reports he was drinking alcohol today and fell. unsure if he lost consciousness. was awake when the son came into the room. no headache or dizziness.

## 2022-05-21 NOTE — SBIRT NOTE ADULT - NSSBIRTOFTENALCOHOL_GEN_A_CORE
2 or 3 times a week Curettage Text: The wound bed was treated with curettage after the biopsy was performed.

## 2022-05-21 NOTE — ED PROVIDER NOTE - PATIENT PORTAL LINK FT
You can access the FollowMyHealth Patient Portal offered by [facility] by registering at the following website: [email or web address]. By joining [employer]’s FollowMyHealth portal, you will also be able to view your health information using other applications (apps) compatible with our system.

## 2022-05-21 NOTE — SBIRT NOTE ADULT - NSSBIRTALCPOSREINDET_GEN_A_CORE
Pt admits to occasional alcohol intake and claims not to have any problem with his alcohol consumption. Emotional support and psychoeducation re alcohol provided.

## 2022-05-21 NOTE — ED PROVIDER NOTE - CARE PROVIDER_API CALL
Porfirio Loya)  Plastic Surgery Surgery  [address] [place], [place] [address]  Phone: [phone]  Fax: [phone]  Follow Up Time:

## 2022-05-21 NOTE — ED PROVIDER NOTE - CLINICAL SUMMARY MEDICAL DECISION MAKING FREE TEXT BOX
72M presenting after a fall. has head and ear laceration. plastics consulted, Dr. Loya. agreed ear lac could be repaired in ED with outpatient followup and antibiotics.

## 2022-05-21 NOTE — ED PROVIDER NOTE - NSFOLLOWUPINSTRUCTIONS_ED_ALL_ED_FT
You were seen in the emergency department after a fall.     Please follow-up with your primary care doctor in the next 24-48 hours.     Please follow-up with a plastic surgeon in the next week.     Please return to the emergency department in 10 days to have sutures and staples removed.     Please finish all of your antibiotics.     Please take Ibuprofen and Tylenol as prescribed on the bottles for pain control.     If you have any worsening symptoms, severe ear pain, swelling, redness or pus drainage, please return to the emergency department.

## 2022-05-21 NOTE — ED ADULT TRIAGE NOTE - CHIEF COMPLAINT QUOTE
Pt BIB Son s/p fall x today with +LOC. Pt noted laceration to left parietal region and left ear. Pt admits drinking whisky x 1 glass, Denies pain, nausea vomiting or change in vision.

## 2022-05-21 NOTE — ED ADULT NURSE NOTE - HOW MANY DRINKS CONTAINING ALCOHOL DO YOU HAVE ON A TYPICAL DAY WHEN YOU ARE DRINKING?
Message sent via [employer]    ----- Message from Sylvester Richards MD sent at 1/20/2017  4:29 PM CST -----  Please let the patient know that the laboratory tests shows his cholesterol triglycerides slightly higher he needs to watch his diet fat intake in or alcohol however his hemoglobin A1c for diabetes did improve and is now normal. As far as the d-dimer is concerned it is very low very good. Recheck the labs again in 6 months prior to the next appointment    
1 or 2

## 2022-05-21 NOTE — CHART NOTE - NSCHARTNOTEFT_GEN_A_CORE
Pt is a  72year old male who came to the ED complaining of head injury. Pt screened positive for sbirt and was seen at bedside re consult. Pt appeared alert and oriented x3. Radha introduced self, role and purpose of visit explained.  Pt participates in screening,     admits to occasional alcohol intake and claims not to have any problem with his alcohol consumption. Emotional support and psychoeducation re alcohol provided. Sbirt screen completed in sunrise. Pt was socially cleared for d/c and physician notified. Pt is a  72year old male who came to the ED complaining of head injury. Pt screened positive for sbirt and was seen at bedside re consult. Pt appeared alert and oriented x3. Radha introduced self, role and purpose of visit explained.  Pt participates in screening, admits to occasional alcohol intake and claims not to have any problem with his alcohol consumption. Emotional support and psychoeducation re alcohol provided. Sbirt screen completed in sunrise. Pt was socially cleared for d/c and physician notified.

## 2022-05-21 NOTE — ED ADULT NURSE NOTE - OBJECTIVE STATEMENT
" he had a glass of scotch, he drinks everyday. he fell in the bedroom between the dresser and the bed and cut his ear and head. i'm not sure if he passed out after, but he says he tripped". per son

## 2022-06-01 ENCOUNTER — EMERGENCY (EMERGENCY)
Facility: HOSPITAL | Age: 72
LOS: 1 days | Discharge: HOME | End: 2022-06-01
Attending: STUDENT IN AN ORGANIZED HEALTH CARE EDUCATION/TRAINING PROGRAM
Payer: COMMERCIAL

## 2022-06-01 VITALS
SYSTOLIC BLOOD PRESSURE: 128 MMHG | HEIGHT: 68 IN | TEMPERATURE: 98 F | OXYGEN SATURATION: 96 % | RESPIRATION RATE: 16 BRPM | DIASTOLIC BLOOD PRESSURE: 79 MMHG | HEART RATE: 87 BPM

## 2022-06-01 PROCEDURE — L9995: CPT

## 2022-06-01 PROCEDURE — G0463: CPT

## 2022-06-01 NOTE — ED PROVIDER NOTE - ATTENDING APP SHARED VISIT CONTRIBUTION OF CARE
I was physically present for the E/M service provided. I agree with above history, physical, and plan which I have reviewed and edited where appropriate. I was physically present for the key portions of the service provided.     well appearing male, no acute distress, normal work of breathing. well healing sutured and stapled lacerations.

## 2022-06-01 NOTE — ED PROVIDER NOTE - NS ED ATTENDING STATEMENT MOD
This was a shared visit with the ALBA. I reviewed and verified the documentation and independently performed the documented:

## 2022-06-01 NOTE — ED PROVIDER NOTE - OBJECTIVE STATEMENT
72 year old male presents for staple and suture removal from May 21.  no fevers, chills, bleeding from site, discharge from site, increased pain, redness, streaking from site or any other signs or symptoms of infection.  No dehiscence noted from wound.

## 2022-06-01 NOTE — ED PROVIDER NOTE - PATIENT PORTAL LINK FT
You can access the FollowMyHealth Patient Portal offered by [facility] by registering at the following website: [email or web address]. By joining [employer]’s FollowMyHealth portal, you will also be able to view your health information using other applications (apps) compatible with our system.

## 2022-06-01 NOTE — ED PROVIDER NOTE - PHYSICAL EXAMINATION
GEN:   comfortable, in no apparent distress, AOx3  EYES:   PERRL, extra-occular movements intact  RESP:  non-labored, speaking in full sentences  ABD:   soft, non tender, no guarding  :   no cva tenderness  MSK:   no musculoskeletal tenderness, 5/5 strength, moving all extremities  SKIN:   dry, no rash.  2 staples right parital scalp and 2 prolene sutures in right ear.  no bleeding from site, discharge from site, increased pain, redness, streaking from site or any other signs or symptoms of infection.  No dehiscence noted from wound.   NEURO:   AOx3, no focal weakness or loss of sensation, gait normal, GCS 15  PSYCH: calm, cooperative, no apparent risk to self and others

## 2023-02-27 NOTE — ED ADULT TRIAGE NOTE - NS ED NURSE BANDS TYPE
Prescription refill requested for:   HYDROcodone-acetaminophen (NORCO)  MG per tablet      Last Written Prescription Date:  2/13/23  Last Fill Quantity: 70,   # refills: 0  Last Office Visit: 2/9/23  Future Office visit:   3/2/23      Alexandru Sheppard ATC       
Name band;

## 2024-09-27 NOTE — ED ADULT NURSE NOTE - DOES PATIENT HAVE ADVANCE DIRECTIVE
Per [facility], the Rx Icosapent Ethyl 1 Gram Capsule is not covered under the patient's Part D plan. The preferred drugs are Simvastatin tablet or Vascepa capsule. The estimated cost for the patient for the Rx is $297.00 for 360 tablets/90 days.     Document for this matter is in your in-box on your desk.          No